# Patient Record
Sex: FEMALE | Race: WHITE | NOT HISPANIC OR LATINO | URBAN - METROPOLITAN AREA
[De-identification: names, ages, dates, MRNs, and addresses within clinical notes are randomized per-mention and may not be internally consistent; named-entity substitution may affect disease eponyms.]

---

## 2017-10-12 ENCOUNTER — EMERGENCY (EMERGENCY)
Facility: HOSPITAL | Age: 82
LOS: 1 days | Discharge: HOME | End: 2017-10-12
Attending: EMERGENCY MEDICINE | Admitting: EMERGENCY MEDICINE
Payer: MEDICARE

## 2017-10-12 VITALS
OXYGEN SATURATION: 98 % | RESPIRATION RATE: 16 BRPM | SYSTOLIC BLOOD PRESSURE: 188 MMHG | HEART RATE: 86 BPM | TEMPERATURE: 98 F | DIASTOLIC BLOOD PRESSURE: 92 MMHG

## 2017-10-12 VITALS — SYSTOLIC BLOOD PRESSURE: 188 MMHG | DIASTOLIC BLOOD PRESSURE: 88 MMHG

## 2017-10-12 DIAGNOSIS — I10 ESSENTIAL (PRIMARY) HYPERTENSION: ICD-10-CM

## 2017-10-12 DIAGNOSIS — R21 RASH AND OTHER NONSPECIFIC SKIN ERUPTION: ICD-10-CM

## 2017-10-12 PROCEDURE — 93010 ELECTROCARDIOGRAM REPORT: CPT

## 2017-10-12 PROCEDURE — 99284 EMERGENCY DEPT VISIT MOD MDM: CPT | Mod: 25

## 2017-10-12 RX ORDER — HYDROCORTISONE 1 %
1 OINTMENT (GRAM) TOPICAL ONCE
Qty: 0 | Refills: 0 | Status: COMPLETED | OUTPATIENT
Start: 2017-10-12 | End: 2017-10-12

## 2017-10-12 RX ORDER — CEPHALEXIN 500 MG
500 CAPSULE ORAL ONCE
Qty: 0 | Refills: 0 | Status: COMPLETED | OUTPATIENT
Start: 2017-10-12 | End: 2017-10-12

## 2017-10-12 RX ADMIN — Medication 1 APPLICATION(S): at 20:21

## 2017-10-12 RX ADMIN — Medication 500 MILLIGRAM(S): at 20:19

## 2017-10-12 NOTE — ED ADULT NURSE NOTE - OBJECTIVE STATEMENT
89 yo female with c/o bilat rash to 's. States went to  "to get a cream" and they sent her here because her bp was high. Pt states hx of htn, did not take her medication today. Denies cp/soa/ha/hematuria.

## 2017-10-12 NOTE — ED PROVIDER NOTE - OBJECTIVE STATEMENT
89 yo female with c/o bilat rash to 's. States went to  "to get a cream" and they sent her here because her bp was high. Pt states hx of htn, did not take her medication today. Denies cp/soa/ha/hematuria.

## 2017-10-13 RX ORDER — HYDROCORTISONE 1 %
1 OINTMENT (GRAM) TOPICAL
Qty: 1 | Refills: 0 | OUTPATIENT
Start: 2017-10-13 | End: 2017-10-27

## 2017-10-13 RX ORDER — CEPHALEXIN 500 MG
1 CAPSULE ORAL
Qty: 28 | Refills: 0 | OUTPATIENT
Start: 2017-10-13 | End: 2017-10-20

## 2017-10-13 NOTE — ED POST DISCHARGE NOTE - REASON FOR FOLLOW-UP
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

## 2020-03-09 ENCOUNTER — EMERGENCY (EMERGENCY)
Facility: HOSPITAL | Age: 85
LOS: 1 days | Discharge: HOME | End: 2020-03-09
Admitting: EMERGENCY MEDICINE
Payer: MEDICARE

## 2020-03-09 VITALS
RESPIRATION RATE: 18 BRPM | SYSTOLIC BLOOD PRESSURE: 192 MMHG | OXYGEN SATURATION: 96 % | DIASTOLIC BLOOD PRESSURE: 73 MMHG | WEIGHT: 110.01 LBS | HEIGHT: 65 IN | HEART RATE: 81 BPM | TEMPERATURE: 97 F

## 2020-03-09 VITALS
HEART RATE: 82 BPM | OXYGEN SATURATION: 99 % | DIASTOLIC BLOOD PRESSURE: 74 MMHG | RESPIRATION RATE: 16 BRPM | SYSTOLIC BLOOD PRESSURE: 160 MMHG | TEMPERATURE: 97 F
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
## 2020-03-09 DIAGNOSIS — S00.83XA CONTUSION OF OTHER PART OF HEAD, INITIAL ENCOUNTER: ICD-10-CM
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
## 2020-03-09 DIAGNOSIS — S09.90XA UNSPECIFIED INJURY OF HEAD, INITIAL ENCOUNTER: ICD-10-CM
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
## 2020-03-09 DIAGNOSIS — M25.552 PAIN IN LEFT HIP: ICD-10-CM

## 2020-03-09 PROBLEM — I10 ESSENTIAL (PRIMARY) HYPERTENSION: Chronic | Status: ACTIVE | Noted: 2017-10-12

## 2020-03-09 LAB
ALBUMIN SERPL ELPH-MCNC: 4.1 G/DL — SIGNIFICANT CHANGE UP (ref 3.4–5)
ALP SERPL-CCNC: 78 U/L — SIGNIFICANT CHANGE UP (ref 40–120)
ALT FLD-CCNC: 31 U/L — SIGNIFICANT CHANGE UP (ref 12–42)
ANION GAP SERPL CALC-SCNC: 8 MMOL/L — LOW (ref 9–16)
APPEARANCE UR: CLEAR — SIGNIFICANT CHANGE UP
AST SERPL-CCNC: 45 U/L — HIGH (ref 15–37)
BILIRUB SERPL-MCNC: 1.2 MG/DL — SIGNIFICANT CHANGE UP (ref 0.2–1.2)
BILIRUB UR-MCNC: NEGATIVE — SIGNIFICANT CHANGE UP
BUN SERPL-MCNC: 34 MG/DL — HIGH (ref 7–23)
CALCIUM SERPL-MCNC: 10.1 MG/DL — SIGNIFICANT CHANGE UP (ref 8.5–10.5)
CHLORIDE SERPL-SCNC: 104 MMOL/L — SIGNIFICANT CHANGE UP (ref 96–108)
CO2 SERPL-SCNC: 28 MMOL/L — SIGNIFICANT CHANGE UP (ref 22–31)
COLOR SPEC: YELLOW — SIGNIFICANT CHANGE UP
CREAT SERPL-MCNC: 0.69 MG/DL — SIGNIFICANT CHANGE UP (ref 0.5–1.3)
DIFF PNL FLD: ABNORMAL
GLUCOSE SERPL-MCNC: 107 MG/DL — HIGH (ref 70–99)
GLUCOSE UR QL: NEGATIVE — SIGNIFICANT CHANGE UP
HCT VFR BLD CALC: 43.4 % — SIGNIFICANT CHANGE UP (ref 34.5–45)
HGB BLD-MCNC: 13.9 G/DL — SIGNIFICANT CHANGE UP (ref 11.5–15.5)
KETONES UR-MCNC: NEGATIVE — SIGNIFICANT CHANGE UP
LEUKOCYTE ESTERASE UR-ACNC: ABNORMAL
MAGNESIUM SERPL-MCNC: 2.1 MG/DL — SIGNIFICANT CHANGE UP (ref 1.6–2.6)
MCHC RBC-ENTMCNC: 30.8 PG — SIGNIFICANT CHANGE UP (ref 27–34)
MCHC RBC-ENTMCNC: 32 GM/DL — SIGNIFICANT CHANGE UP (ref 32–36)
MCV RBC AUTO: 96 FL — SIGNIFICANT CHANGE UP (ref 80–100)
NITRITE UR-MCNC: NEGATIVE — SIGNIFICANT CHANGE UP
NRBC # BLD: 0 /100 WBCS — SIGNIFICANT CHANGE UP (ref 0–0)
NT-PROBNP SERPL-SCNC: 297 PG/ML — SIGNIFICANT CHANGE UP
PH UR: 6.5 — SIGNIFICANT CHANGE UP (ref 5–8)
PLATELET # BLD AUTO: 178 K/UL — SIGNIFICANT CHANGE UP (ref 150–400)
POTASSIUM SERPL-MCNC: 4.9 MMOL/L — SIGNIFICANT CHANGE UP (ref 3.5–5.3)
POTASSIUM SERPL-SCNC: 4.9 MMOL/L — SIGNIFICANT CHANGE UP (ref 3.5–5.3)
PROT SERPL-MCNC: 8.2 G/DL — SIGNIFICANT CHANGE UP (ref 6.4–8.2)
PROT UR-MCNC: NEGATIVE MG/DL — SIGNIFICANT CHANGE UP
RBC # BLD: 4.52 M/UL — SIGNIFICANT CHANGE UP (ref 3.8–5.2)
RBC # FLD: 13.2 % — SIGNIFICANT CHANGE UP (ref 10.3–14.5)
SODIUM SERPL-SCNC: 140 MMOL/L — SIGNIFICANT CHANGE UP (ref 132–145)
SP GR SPEC: 1.01 — SIGNIFICANT CHANGE UP (ref 1–1.03)
TROPONIN I SERPL-MCNC: 0.03 NG/ML — SIGNIFICANT CHANGE UP (ref 0.02–0.06)
TROPONIN I SERPL-MCNC: 0.04 NG/ML — SIGNIFICANT CHANGE UP (ref 0.02–0.06)
UROBILINOGEN FLD QL: 0.2 E.U./DL — SIGNIFICANT CHANGE UP
WBC # BLD: 7.72 K/UL — SIGNIFICANT CHANGE UP (ref 3.8–10.5)
WBC # FLD AUTO: 7.72 K/UL — SIGNIFICANT CHANGE UP (ref 3.8–10.5)

## 2020-03-09 PROCEDURE — 99285 EMERGENCY DEPT VISIT HI MDM: CPT

## 2020-03-09 PROCEDURE — 71045 X-RAY EXAM CHEST 1 VIEW: CPT | Mod: 26

## 2020-03-09 PROCEDURE — 93010 ELECTROCARDIOGRAM REPORT: CPT

## 2020-03-09 PROCEDURE — 70450 CT HEAD/BRAIN W/O DYE: CPT | Mod: 26

## 2020-03-09 PROCEDURE — 72125 CT NECK SPINE W/O DYE: CPT | Mod: 26

## 2020-03-09 PROCEDURE — 73502 X-RAY EXAM HIP UNI 2-3 VIEWS: CPT | Mod: 26,LT

## 2020-03-09 RX ORDER — CEFTRIAXONE 500 MG/1
1000 INJECTION, POWDER, FOR SOLUTION INTRAMUSCULAR; INTRAVENOUS ONCE
Refills: 0 | Status: COMPLETED | OUTPATIENT
Start: 2020-03-09 | End: 2020-03-09

## 2020-03-09 RX ORDER — ACETAMINOPHEN 500 MG
650 TABLET ORAL ONCE
Refills: 0 | Status: COMPLETED | OUTPATIENT
Start: 2020-03-09 | End: 2020-03-09

## 2020-03-09 RX ADMIN — CEFTRIAXONE 100 MILLIGRAM(S): 500 INJECTION, POWDER, FOR SOLUTION INTRAMUSCULAR; INTRAVENOUS at 19:22

## 2020-03-09 RX ADMIN — Medication 650 MILLIGRAM(S): at 19:02

## 2020-03-09 RX ADMIN — Medication 650 MILLIGRAM(S): at 16:18

## 2020-03-09 NOTE — ED PROVIDER NOTE - OBJECTIVE STATEMENT
89 y/o female with no significant PMHx, not on any blood thinners, presents to ED s/p fall and head injury. Patient reports she was on the phone about the answer the doorbell, and is unsure of how she fell. As per HHA, she was notified by the security, knocked on patient's door, and when the door was not opened, had the door opened and found the patient sitting on a chair with an abrasion to the left forehead. Patient denies any known LOC. Currently reports slight left hip pain. Denies any other acute complaints.

## 2020-03-09 NOTE — ED ADULT TRIAGE NOTE - CHIEF COMPLAINT QUOTE
Pt, picked up from home for fall, pt. noted to have large hematoma to the left forehead and skin tears to the left arm. AS per ems pt. daughter saw her lying on the floor via a nanny cam and called 911. As per HA pt. has no mental status changes AOX3 in no distress.

## 2020-03-09 NOTE — ED ADULT NURSE NOTE - FALL OUT OF, OFF OF, THROUGH
fell to floor from standing position. Pt not exactly sure how she fell - says the phone was ringing, the doorbell rang, she must have tripped she states

## 2020-03-09 NOTE — ED PROVIDER NOTE - CARE PLAN
Assessment and plan of treatment:	assess  labs  ct  xr  r/o UTI   reassess Principal Discharge DX:	Fall, initial encounter  Assessment and plan of treatment:	assess  labs  ct  xr  r/o UTI   reassess

## 2020-03-09 NOTE — ED PROVIDER NOTE - ENMT, MLM
Airway patent. Mouth with normal mucosa. Throat has no vesicles, no oropharyngeal exudates and uvula is midline. 3x3cm hematoma to the left forehead with discoloration and abrasion.

## 2020-03-09 NOTE — ED PROVIDER NOTE - PATIENT PORTAL LINK FT
You can access the FollowMyHealth Patient Portal offered by [facility] by registering at the following website: [email or web address]. By joining [email or web address]’s FollowMyHealth portal, you will also be able to view your health information using other applications (apps) compatible with our system.

## 2020-03-09 NOTE — ED PROVIDER NOTE - SKIN, MLM
Right lower leg is more erythematous than the left lower leg, right lower leg with 3+ pitting edema concerning for the beginning of cellulitis.

## 2020-03-09 NOTE — ED ADULT NURSE REASSESSMENT NOTE - NS ED NURSE REASSESS COMMENT FT1
Pt care anded over to myself, introduced self to pt and carer, given dc papers awaiting ambulance to take ome, @ 2200 pt aware

## 2020-03-23 ENCOUNTER — EMERGENCY (EMERGENCY)
Facility: HOSPITAL | Age: 85
LOS: 1 days | Discharge: HOME | End: 2020-03-23
Attending: EMERGENCY MEDICINE | Admitting: EMERGENCY MEDICINE
Payer: MEDICARE

## 2020-03-23 VITALS
DIASTOLIC BLOOD PRESSURE: 66 MMHG | OXYGEN SATURATION: 97 % | HEART RATE: 87 BPM | TEMPERATURE: 98 F | RESPIRATION RATE: 16 BRPM | SYSTOLIC BLOOD PRESSURE: 144 MMHG

## 2020-03-23 DIAGNOSIS — M25.551 PAIN IN RIGHT HIP: ICD-10-CM
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
## 2020-03-23 DIAGNOSIS — L03.115 CELLULITIS OF RIGHT LOWER LIMB: ICD-10-CM
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
## 2020-03-23 LAB
ALBUMIN SERPL ELPH-MCNC: 3.4 G/DL — SIGNIFICANT CHANGE UP (ref 3.4–5)
ALP SERPL-CCNC: 123 U/L — HIGH (ref 40–120)
ALT FLD-CCNC: 50 U/L — HIGH (ref 12–42)
ANION GAP SERPL CALC-SCNC: 8 MMOL/L — LOW (ref 9–16)
APPEARANCE UR: CLEAR — SIGNIFICANT CHANGE UP
AST SERPL-CCNC: 72 U/L — HIGH (ref 15–37)
BASOPHILS # BLD AUTO: 0.04 K/UL — SIGNIFICANT CHANGE UP (ref 0–0.2)
BASOPHILS NFR BLD AUTO: 0.5 % — SIGNIFICANT CHANGE UP (ref 0–2)
BILIRUB SERPL-MCNC: 0.7 MG/DL — SIGNIFICANT CHANGE UP (ref 0.2–1.2)
BILIRUB UR-MCNC: NEGATIVE — SIGNIFICANT CHANGE UP
BUN SERPL-MCNC: 31 MG/DL — HIGH (ref 7–23)
CALCIUM SERPL-MCNC: 9.8 MG/DL — SIGNIFICANT CHANGE UP (ref 8.5–10.5)
CHLORIDE SERPL-SCNC: 106 MMOL/L — SIGNIFICANT CHANGE UP (ref 96–108)
CO2 SERPL-SCNC: 25 MMOL/L — SIGNIFICANT CHANGE UP (ref 22–31)
COLOR SPEC: YELLOW — SIGNIFICANT CHANGE UP
CREAT SERPL-MCNC: 1.04 MG/DL — SIGNIFICANT CHANGE UP (ref 0.5–1.3)
DIFF PNL FLD: ABNORMAL
EOSINOPHIL # BLD AUTO: 0.18 K/UL — SIGNIFICANT CHANGE UP (ref 0–0.5)
EOSINOPHIL NFR BLD AUTO: 2.2 % — SIGNIFICANT CHANGE UP (ref 0–6)
GLUCOSE BLDC GLUCOMTR-MCNC: 136 MG/DL — HIGH (ref 70–99)
GLUCOSE SERPL-MCNC: 157 MG/DL — HIGH (ref 70–99)
GLUCOSE UR QL: NEGATIVE — SIGNIFICANT CHANGE UP
HCT VFR BLD CALC: 40.7 % — SIGNIFICANT CHANGE UP (ref 34.5–45)
HGB BLD-MCNC: 13 G/DL — SIGNIFICANT CHANGE UP (ref 11.5–15.5)
IMM GRANULOCYTES NFR BLD AUTO: 0.4 % — SIGNIFICANT CHANGE UP (ref 0–1.5)
KETONES UR-MCNC: NEGATIVE — SIGNIFICANT CHANGE UP
LACTATE SERPL-SCNC: 1.4 MMOL/L — SIGNIFICANT CHANGE UP (ref 0.4–2)
LEUKOCYTE ESTERASE UR-ACNC: ABNORMAL
LYMPHOCYTES # BLD AUTO: 1.18 K/UL — SIGNIFICANT CHANGE UP (ref 1–3.3)
LYMPHOCYTES # BLD AUTO: 14.4 % — SIGNIFICANT CHANGE UP (ref 13–44)
MAGNESIUM SERPL-MCNC: 2.2 MG/DL — SIGNIFICANT CHANGE UP (ref 1.6–2.6)
MCHC RBC-ENTMCNC: 30.7 PG — SIGNIFICANT CHANGE UP (ref 27–34)
MCHC RBC-ENTMCNC: 31.9 GM/DL — LOW (ref 32–36)
MCV RBC AUTO: 96.2 FL — SIGNIFICANT CHANGE UP (ref 80–100)
MONOCYTES # BLD AUTO: 0.65 K/UL — SIGNIFICANT CHANGE UP (ref 0–0.9)
MONOCYTES NFR BLD AUTO: 8 % — SIGNIFICANT CHANGE UP (ref 2–14)
NEUTROPHILS # BLD AUTO: 6.09 K/UL — SIGNIFICANT CHANGE UP (ref 1.8–7.4)
NEUTROPHILS NFR BLD AUTO: 74.5 % — SIGNIFICANT CHANGE UP (ref 43–77)
NITRITE UR-MCNC: NEGATIVE — SIGNIFICANT CHANGE UP
NRBC # BLD: 0 /100 WBCS — SIGNIFICANT CHANGE UP (ref 0–0)
PH UR: 6 — SIGNIFICANT CHANGE UP (ref 5–8)
PLATELET # BLD AUTO: 191 K/UL — SIGNIFICANT CHANGE UP (ref 150–400)
POTASSIUM SERPL-MCNC: 5 MMOL/L — SIGNIFICANT CHANGE UP (ref 3.5–5.3)
POTASSIUM SERPL-SCNC: 5 MMOL/L — SIGNIFICANT CHANGE UP (ref 3.5–5.3)
PROT SERPL-MCNC: 7.5 G/DL — SIGNIFICANT CHANGE UP (ref 6.4–8.2)
PROT UR-MCNC: NEGATIVE MG/DL — SIGNIFICANT CHANGE UP
RBC # BLD: 4.23 M/UL — SIGNIFICANT CHANGE UP (ref 3.8–5.2)
RBC # FLD: 13.5 % — SIGNIFICANT CHANGE UP (ref 10.3–14.5)
SODIUM SERPL-SCNC: 139 MMOL/L — SIGNIFICANT CHANGE UP (ref 132–145)
SP GR SPEC: 1.01 — SIGNIFICANT CHANGE UP (ref 1–1.03)
UROBILINOGEN FLD QL: 0.2 E.U./DL — SIGNIFICANT CHANGE UP
WBC # BLD: 8.17 K/UL — SIGNIFICANT CHANGE UP (ref 3.8–10.5)
WBC # FLD AUTO: 8.17 K/UL — SIGNIFICANT CHANGE UP (ref 3.8–10.5)

## 2020-03-23 PROCEDURE — 99284 EMERGENCY DEPT VISIT MOD MDM: CPT | Mod: 25

## 2020-03-23 PROCEDURE — 70450 CT HEAD/BRAIN W/O DYE: CPT | Mod: 26

## 2020-03-23 PROCEDURE — 73700 CT LOWER EXTREMITY W/O DYE: CPT | Mod: 26,50

## 2020-03-23 PROCEDURE — 72125 CT NECK SPINE W/O DYE: CPT | Mod: 26

## 2020-03-23 RX ORDER — VANCOMYCIN HCL 1 G
1000 VIAL (EA) INTRAVENOUS ONCE
Refills: 0 | Status: COMPLETED | OUTPATIENT
Start: 2020-03-23 | End: 2020-03-23

## 2020-03-23 RX ORDER — SODIUM CHLORIDE 9 MG/ML
1000 INJECTION INTRAMUSCULAR; INTRAVENOUS; SUBCUTANEOUS ONCE
Refills: 0 | Status: COMPLETED | OUTPATIENT
Start: 2020-03-23 | End: 2020-03-23

## 2020-03-23 RX ADMIN — Medication 1000 MILLIGRAM(S): at 22:00

## 2020-03-23 RX ADMIN — SODIUM CHLORIDE 1000 MILLILITER(S): 9 INJECTION INTRAMUSCULAR; INTRAVENOUS; SUBCUTANEOUS at 23:00

## 2020-03-23 RX ADMIN — SODIUM CHLORIDE 500 MILLILITER(S): 9 INJECTION INTRAMUSCULAR; INTRAVENOUS; SUBCUTANEOUS at 21:08

## 2020-03-23 RX ADMIN — Medication 250 MILLIGRAM(S): at 21:10

## 2020-03-23 NOTE — ED PROVIDER NOTE - CLINICAL SUMMARY MEDICAL DECISION MAKING FREE TEXT BOX
Pt with mechanical fall at home. Cellulitis noted at last visit. Will obtain labs, lactate, blood cultures, and imaging. R/o fx however low suspicion. Anticipate admission. Pt with no home support.

## 2020-03-23 NOTE — ED ADULT NURSE NOTE - NSFALLRSKASSESASSIST_ED_ALL_ED
Anesthesia Post Op Note


Date & Time


Mar 30, 2018 at 11:49





Vital Signs


Pain Intensity:  5





Vital Signs Past 12 Hours








  Date Time  Temp Pulse Resp B/P (MAP) Pulse Ox O2 Delivery O2 Flow Rate FiO2


 


3/30/18 11:45  65 16 151/85 (107) 99 Room Air  


 


3/30/18 11:24 36.9 68 16 145/84 (104) 99 Room Air  


 


3/30/18 09:35 36.5 65 16 128/87 (101) 97 Room Air  











Notes


Mental Status:  alert / awake / arousable, participated in evaluation


Pt Amnestic to Procedure:  Yes


Nausea / Vomiting:  adequately controlled


Pain:  adequately controlled


Airway Patency, RR, SpO2:  stable & adequate


BP & HR:  stable & adequate


Hydration State:  stable & adequate


Anesthetic Complications:  no major complications apparent no

## 2020-03-23 NOTE — ED ADULT TRIAGE NOTE - CHIEF COMPLAINT QUOTE
Pt brought in by EMS after pt fell and hit her head while attempting to open the refrigerator door. Pt states her home health aide has not come to help her at home for the past 2 days. Pt reports history of falls.

## 2020-03-23 NOTE — ED PROVIDER NOTE - CHPI ED SYMPTOMS NEG
no loss of consciousness/no headache, no chest pain, no neck pain, no back pain, no cough, no chills, no SOB, no dizziness/no fever

## 2020-03-23 NOTE — ED PROVIDER NOTE - DATE/TIME 3
Patient called for his lab results. He was informed of lab results and will discuss them with provider at his appointment on 3/16/17 at 7:30 am. Please see for review.    24-Mar-2020 14:00

## 2020-03-23 NOTE — ED PROVIDER NOTE - PROGRESS NOTE DETAILS
Laverne (pt's daughter): [phone], [phone]    Spoke to pt's daughter and discussed a safe plan for the patient. Daughter mentioned that she is close to the pt's home health aid and will be picking up pt from here around midday tomorrow. Pt would like to go home. Lab work unremarkable. Give abx fo leg cellulitis. PMD is to see the pt in her home for f/u. Discussed strict return precautions including fever and worsened leg swelling. Laverne (pt's daughter): [phone], cell: [phone]    Spoke to pt's daughter and discussed a safe plan for the patient. Daughter mentioned that she is close to the pt's home health aid and that she will be picking up pt from here around midday tomorrow. Pt would like to go home. Lab work unremarkable. Give abx fo leg cellulitis. Pt's PMD is to see her at home for f/u in the next few days. Discussed strict return precautions including fever and worsened leg swelling. Dr. Chi -[phone]  pcp Patient able to support weight  Aid at bedside family and pcp aware of plan to send home   coordinated with agency

## 2020-03-23 NOTE — ED ADULT NURSE NOTE - AREA
diffuse skin tear irregular shape 2in. cleaned with ns applied xenoform, and covered with telfa and coban./lateral

## 2020-03-23 NOTE — ED ADULT NURSE NOTE - NSIMPLEMENTINTERV_GEN_ALL_ED
Implemented All Fall with Harm Risk Interventions:  [place] to call system. Call bell, personal items and telephone within reach. Instruct patient to call for assistance. Room bathroom lighting operational. Non-slip footwear when patient is off stretcher. Physically safe environment: no spills, clutter or unnecessary equipment. Stretcher in lowest position, wheels locked, appropriate side rails in place. Provide visual cue, wrist band, yellow gown, etc. Monitor gait and stability. Monitor for mental status changes and reorient to person, place, and time. Review medications for side effects contributing to fall risk. Reinforce activity limits and safety measures with patient and family. Provide visual clues: red socks.

## 2020-03-23 NOTE — ED PROVIDER NOTE - CARDIAC, MLM
Normal rate, regular rhythm.  No murmurs, rubs or gallops. Normal rate, regular rhythm.  No murmurs, rubs or gallops. no chest wall tenderness; b/l pedal pulses equal

## 2020-03-23 NOTE — ED PROVIDER NOTE - MUSCULOSKELETAL, MLM
Spine appears normal, range of motion is not limited, no muscle or joint tenderness Spine appears normal no midline spine tenderness to cervical thoracic lumbar spine, left hip with pain on ROM however able to range b/l hips

## 2020-03-23 NOTE — ED PROVIDER NOTE - SKIN, MLM
+Erythema on RLE (noted on document from previous visit) +Erythema on RLE (noted on document from previous visit) slight erythema; old appearing ecchymosis to left side of face and slight abrasion to posterior scalp

## 2020-03-23 NOTE — ED PROVIDER NOTE - ATTENDING CONTRIBUTION TO CARE
89 yof pw fall at home.  baseline w/ walker.  pt states her hha has not been to see her in the last 2 days.  per EMS, daughter has a security camera at home and saw her lying on the floor.  spoke w/ pt's daughter, the hha had to go for health test and she has been monitoring her on the security camera and noted the fall/lying on the floor today.  pt c/o left hip pain.      agree w/ PA, will obtain CT eval for trauma, labs, abx for cellulitis for RLE    discussed w/ pt's daughter, prefers pt to go home, and there will be hha today, and she is arranging home visit from her PMD for outpatient pt/ot.  amenable to keep pt in obs tonight til am for safety dc. 89 yof pw fall at home.  baseline w/ walker.  pt states her hha has not been to see her in the last 2 days.  per EMS, daughter has a security camera at home and saw her lying on the floor.  spoke w/ pt's daughter, the hha had to go for health test and she has been monitoring her on the security camera and noted the fall/lying on the floor today.  pt c/o left hip pain.  no ha, no neck pain, no cp, no sob.  denies sx prior to fall (states was opening her fridge door and lost balance)    agree w/ PA, will obtain CT eval for trauma, labs, abx for cellulitis for RLE    discussed w/ pt's daughter, prefers pt to go home, and there will be hha today, and she is arranging home visit from her PMD for outpatient pt/ot.  amenable to keep pt in obs tonight til am for safety dc. 89 yof pw fall at home.  baseline w/ walker.  pt states her hha has not been to see her in the last 2 days.  per EMS, daughter has a security camera at home and saw her lying on the floor.  spoke w/ pt's daughter, the hha had to go for health test and she has been monitoring her on the security camera and noted the fall/lying on the floor today.  pt c/o left hip pain.  no ha, no neck pain, no cp, no sob.  denies sx prior to fall (states was opening her fridge door and lost balance)    agree w/ PA, will obtain CT eval for trauma, labs, abx for cellulitis for RLE, bl LE (below knee) has erythema and warmth, R>L, also R calf more swollen, will obtain US duplex while waiting for discharge planning    discussed w/ pt's daughter, prefers pt to go home, and there will be hha today, and she is arranging home visit from her PMD for outpatient pt/ot.  amenable to keep pt in obs tonight til am for safety dc.

## 2020-03-23 NOTE — ED PROVIDER NOTE - PATIENT PORTAL LINK FT
You can access the FollowMyHealth Patient Portal offered by [facility] by registering at the following website: [email or web address]. By joining [email or web address]’s FollowMyHealth portal, you will also be able to view your health information using other applications (apps) compatible with our system.

## 2020-03-24 VITALS
SYSTOLIC BLOOD PRESSURE: 175 MMHG | HEART RATE: 84 BPM | OXYGEN SATURATION: 97 % | TEMPERATURE: 98 F | DIASTOLIC BLOOD PRESSURE: 73 MMHG | RESPIRATION RATE: 18 BRPM

## 2020-03-24 PROCEDURE — 93971 EXTREMITY STUDY: CPT | Mod: 26,RT

## 2020-03-24 PROCEDURE — 99234 HOSP IP/OBS SM DT SF/LOW 45: CPT

## 2020-03-24 RX ORDER — ACETAMINOPHEN 500 MG
650 TABLET ORAL ONCE
Refills: 0 | Status: COMPLETED | OUTPATIENT
Start: 2020-03-24 | End: 2020-03-24

## 2020-03-24 RX ADMIN — Medication 650 MILLIGRAM(S): at 08:17

## 2020-03-24 NOTE — ED CDU PROVIDER INITIAL DAY NOTE - ATTENDING CONTRIBUTION TO CARE
89 yof pw fall at home.  baseline w/ walker.  pt states her hha has not been to see her in the last 2 days.  per EMS, daughter has a security camera at home and saw her lying on the floor.  spoke w/ pt's daughter, the hha had to go for health test and she has been monitoring her on the security camera and noted the fall/lying on the floor today.  pt c/o left hip pain.  no ha, no neck pain, no cp, no sob.  denies sx prior to fall (states was opening her fridge door and lost balance)    agree w/ PA, will obtain CT eval for trauma, labs, abx for cellulitis for RLE    discussed w/ pt's daughter, prefers pt to go home, and there will be hha today, and she is arranging home visit from her PMD for outpatient pt/ot.  amenable to keep pt in obs tonight til am for safety dc. pt placed on obs for safety discharge planning in the am  anna    will reach out to family again the AM to coordinate plan

## 2020-03-24 NOTE — ED ADULT NURSE REASSESSMENT NOTE - NS ED NURSE REASSESS COMMENT FT1
pt HHA at bedside. awaiting transport home.
report received, pt resting in stretcher, AAOx3. Breathing unlabored and spontaneous, no complaints at this time, will continue to monitor.
Pt presently sleeping.  Breathing even and unlabored.  Will continue to monitor.
Pt resting at bedside.   Makes no c/o at this time.  Pt [place].  Will continue to monitor.

## 2020-03-24 NOTE — ED CDU PROVIDER INITIAL DAY NOTE - OBJECTIVE STATEMENT
89 y o female with PMHx of HTN, ambulates with walker at baseline, was BIBA from home for a fall. EMS reports that patient's daughter saw her lying on the floor via nanny cam and called 911. Pt has a home health aid who usually visits her daily but has not visited in the past 2 days. Today, pt was attempting to open her refrigerator when she fell back and struck her head. Pt notes mild bilateral hip pain. Denies headache, chest pain, LOC, neck pain, back pain, cough, fever, chills, SOB, dizziness, or other sx. Pt with hx of a mechanical fall last week.     Old chart review on 3/9: On exam, pt's right lower leg was noted to be more erythematous than the left. Concerned for cellulitis. Pt found to have UTI at the time and possible cellulitis. Note of pt applying Cefadroxil. Unknown if pt has been taking her medication at home.

## 2020-03-24 NOTE — ED CDU PROVIDER INITIAL DAY NOTE - CHPI ED SYMPTOMS NEG
no headache, no chest pain, no neck pain, no back pain, no cough, no chills, no SOB, no dizziness/no loss of consciousness/no fever

## 2020-03-24 NOTE — ED CDU PROVIDER INITIAL DAY NOTE - DETAILS
pt to go home in the morning and hha to meet at home shortly after.  pts daughter in touch with pmd to arraign home eval and PT at home.

## 2020-03-27 LAB
-  AMIKACIN: SIGNIFICANT CHANGE UP
-  AMPICILLIN/SULBACTAM: SIGNIFICANT CHANGE UP
-  AMPICILLIN: SIGNIFICANT CHANGE UP
-  AMPICILLIN: SIGNIFICANT CHANGE UP
-  AZTREONAM: SIGNIFICANT CHANGE UP
-  CEFAZOLIN: SIGNIFICANT CHANGE UP
-  CEFEPIME: SIGNIFICANT CHANGE UP
-  CEFOXITIN: SIGNIFICANT CHANGE UP
-  CEFTRIAXONE: SIGNIFICANT CHANGE UP
-  CIPROFLOXACIN: SIGNIFICANT CHANGE UP
-  CIPROFLOXACIN: SIGNIFICANT CHANGE UP
-  GENTAMICIN: SIGNIFICANT CHANGE UP
-  IMIPENEM: SIGNIFICANT CHANGE UP
-  LEVOFLOXACIN: SIGNIFICANT CHANGE UP
-  LEVOFLOXACIN: SIGNIFICANT CHANGE UP
-  MEROPENEM: SIGNIFICANT CHANGE UP
-  NITROFURANTOIN: SIGNIFICANT CHANGE UP
-  NITROFURANTOIN: SIGNIFICANT CHANGE UP
-  PIPERACILLIN/TAZOBACTAM: SIGNIFICANT CHANGE UP
-  TETRACYCLINE: SIGNIFICANT CHANGE UP
-  TIGECYCLINE: SIGNIFICANT CHANGE UP
-  TOBRAMYCIN: SIGNIFICANT CHANGE UP
-  TRIMETHOPRIM/SULFAMETHOXAZOLE: SIGNIFICANT CHANGE UP
-  VANCOMYCIN: SIGNIFICANT CHANGE UP
CULTURE RESULTS: SIGNIFICANT CHANGE UP
METHOD TYPE: SIGNIFICANT CHANGE UP
METHOD TYPE: SIGNIFICANT CHANGE UP
ORGANISM # SPEC MICROSCOPIC CNT: SIGNIFICANT CHANGE UP
SPECIMEN SOURCE: SIGNIFICANT CHANGE UP

## 2020-03-27 RX ORDER — CIPROFLOXACIN LACTATE 400MG/40ML
1 VIAL (ML) INTRAVENOUS
Qty: 10 | Refills: 0
Start: 2020-03-27 | End: 2020-03-31

## 2020-03-27 NOTE — ED POST DISCHARGE NOTE - RESULT SUMMARY
OCX (+) 2 orgs, sensitive to cipro/macrobid, pt dced with doxyPO, I called and left a message for her to call back for wellness and check and abx change.

## 2020-03-28 NOTE — ED CDU PROVIDER DISPOSITION NOTE - ATTENDING CONTRIBUTION TO CARE
89 yof pw fall at home.  baseline w/ walker.  pt states her hha has not been to see her in the last 2 days.  per EMS, daughter has a security camera at home and saw her lying on the floor.  spoke w/ pt's daughter, the hha had to go for health test and she has been monitoring her on the security camera and noted the fall/lying on the floor today.  pt c/o left hip pain.  no ha, no neck pain, no cp, no sob.  denies sx prior to fall (states was opening her fridge door and lost balance)    agree w/ PA, will obtain CT eval for trauma, labs, abx for cellulitis for RLE, bl LE (below knee) has erythema and warmth, R>L, also R calf more swollen, will obtain US duplex while waiting for discharge planning    discussed w/ pt's daughter, prefers pt to go home, and there will be hha today, and she is arranging home visit from her PMD for outpatient pt/ot.  amenable to keep pt in obs tonight til am for safety dc.    Arranged with A service for home dispo   AID came and went with patient to household.   Patient was able to bear weight. Appeared well NAD. JS

## 2020-03-28 NOTE — ED CDU PROVIDER DISPOSITION NOTE - PATIENT PORTAL LINK FT
You can access the FollowMyHealth Patient Portal offered by [facility] by registering at the following website: [email or web address]. By joining [email or web address]’s FollowMyHealth portal, you will also be able to view your health information using other applications (apps) compatible with our system.

## 2020-03-28 NOTE — ED CDU PROVIDER DISPOSITION NOTE - CLINICAL COURSE
Patient seen in ED for fall  Imaging revealed non displaced fractures   Can ambulate   Patient transport arranged with AID and service   Daughter and pcp aware

## 2020-03-29 LAB
CULTURE RESULTS: SIGNIFICANT CHANGE UP
CULTURE RESULTS: SIGNIFICANT CHANGE UP
SPECIMEN SOURCE: SIGNIFICANT CHANGE UP
SPECIMEN SOURCE: SIGNIFICANT CHANGE UP

## 2022-06-28 NOTE — ED ADULT NURSE NOTE - NSFALLRSKUNASSIST_ED_ALL_ED
Thank you for allowing the [facility] to be a part of your care. Please pay close attention to the following medications as they have been changed during this visit.    1.) Please start taking furosemide 20 mg alternating with 40 mg every other day. You should not be taking 40 mg on consecutive days nor should you be taking 20 mg on consecutive days. These medications should be on alternating days  
no

## 2023-08-06 NOTE — ED PROVIDER NOTE - FALL CAUSE
Problem: Impaired Physical Mobility  Goal: # Bed mobility, ambulation, and ADLs are maintained or returned to baseline during hospitalization  Outcome: Outcome Met, Continue evaluating goal progress toward completion     Problem: At Risk for Falls  Goal: # Patient does not fall  Outcome: Outcome Met, Continue evaluating goal progress toward completion  Goal: # Takes action to control fall-related risks  Outcome: Outcome Met, Continue evaluating goal progress toward completion  Goal: # Verbalizes understanding of fall risk/precautions  Description: Document education using the patient education activity  Outcome: Outcome Met, Continue evaluating goal progress toward completion     Problem: At Risk for Injury Due to Fall  Goal: # Patient does not fall  Outcome: Outcome Met, Continue evaluating goal progress toward completion  Goal: # Takes action to control condition specific risks  Outcome: Outcome Met, Continue evaluating goal progress toward completion  Goal: # Verbalizes understanding of fall-related injury personal risks  Description: Document education using the patient education activity  Outcome: Outcome Met, Continue evaluating goal progress toward completion     Problem: Pain  Goal: #Acceptable pain level achieved/maintained at rest using NRS/Faces  Description: This goal is used for patients who can self-report.  Acceptable means the level is at or below the identified comfort/function goal.  Outcome: Outcome Met, Continue evaluating goal progress toward completion  Goal: # Acceptable pain level achieved/maintained at rest using NRS/Faces without oversedation (opioid naive or PCA/Epidural infusion)  Description: This goal is used if Opioid-naïve or on PCA/Epidural Infusion.  Outcome: Outcome Met, Continue evaluating goal progress toward completion  Goal: # Acceptable pain level achieved/maintained with activity using NRS/Faces  Description: This goal is used for patients who can self-report and are not  achieving acceptable pain control during activity.  Outcome: Outcome Met, Continue evaluating goal progress toward completion  Goal: Acceptable pain/comfort level is achieved/maintained at rest (based on Pain Behaviors Scale)  Description: This goal is used for patients who are not able to self-report pain and are assessed for pain using the Pain Behaviors Scale  Outcome: Outcome Met, Continue evaluating goal progress toward completion  Goal: Acceptable pain/comfort level is achieved/maintained at rest based on PAINAID scale (Dementia)  Description: This goal is used for patients who are not able to self-report pain, have dementia, and assessed using the PAINAD scale.  Outcome: Outcome Met, Continue evaluating goal progress toward completion  Goal: Acceptable pain/comfort level is achieved/maintained at rest (based on pediatric behavior tool: NIPS, NPASS, or FLACC)  Description: This goal is used for pediatric patients who are not able to self report pain.  Outcome: Outcome Met, Continue evaluating goal progress toward completion  Goal: # Verbalizes understanding of pain management  Description: Documented in Patient Education Activity  Outcome: Outcome Met, Continue evaluating goal progress toward completion  Goal: Verbalizes understanding and effective use of Patient Controlled Analgesia (PCA)  Description: Documented in Patient Education Activity  This goal is used for patients with PCA  Outcome: Outcome Met, Continue evaluating goal progress toward completion  Goal: Maximum comfort achieved/maintained at end of life (Hospice)  Outcome: Outcome Met, Continue evaluating goal progress toward completion     Problem: Activity Intolerance  Goal: # Functional status is maintained or returned to baseline  Outcome: Outcome Met, Continue evaluating goal progress toward completion  Goal: # Tolerates activity for d/c setting with no clinical problems  Outcome: Outcome Met, Continue evaluating goal progress toward  completion     Problem: Pressure Injury, Risk for  Goal: # Skin remains intact  Outcome: Outcome Met, Continue evaluating goal progress toward completion  Goal: No new pressure injury (PI) development  Outcome: Outcome Met, Continue evaluating goal progress toward completion  Goal: # Verbalizes understanding of PI risk factors and prevention strategies  Description: Document education using the patient education activity.   Outcome: Outcome Met, Continue evaluating goal progress toward completion  Goal: Comfort optimized with pressure injury prevention strategies guided by patient/family preference. (Hospice)  Outcome: Outcome Met, Continue evaluating goal progress toward completion     Problem: Pressure Injury Actual  Goal: # No deterioration in pressure injury (PI)  Outcome: Outcome Met, Continue evaluating goal progress toward completion  Goal: # Verbalizes pressure injury management  Description: Document education using the patient education activity.  Outcome: Outcome Met, Continue evaluating goal progress toward completion  Goal: Wound care provided to promote comfort needs (Hospice)  Outcome: Outcome Met, Continue evaluating goal progress toward completion     Problem: Non-Pressure Injury Wound  Goal: # No deterioration in wound  Outcome: Outcome Met, Continue evaluating goal progress toward completion  Goal: # Verbalizes understanding of wound and wound care  Description: If abnormality is a skin tear, avoid using tape on skin including transparent dressings. Document education using the patient education activity.  Outcome: Outcome Met, Continue evaluating goal progress toward completion  Goal: Participates in wound care activities  Outcome: Outcome Met, Continue evaluating goal progress toward completion  Goal: Wound care provided to promote comfort needs (Hospice)  Outcome: Outcome Met, Continue evaluating goal progress toward completion     Problem: Cellulitis  Goal: Cellulitis improved as evidenced by  decreased redness, swelling and pain  Description: Girth measurement may be used to evaluate edema.  Outcome: Outcome Met, Continue evaluating goal progress toward completion  Goal: Care provided to promote comfort needs (Hospice)  Outcome: Outcome Met, Continue evaluating goal progress toward completion      slipping, stumbling. tripping

## 2023-11-13 NOTE — ED PROVIDER NOTE - SECONDARY DIAGNOSIS.
Solaraze Counseling:  I discussed with the patient the risks of Solaraze including but not limited to erythema, scaling, itching, weeping, crusting, and pain. Cellulitis